# Patient Record
Sex: MALE | Race: BLACK OR AFRICAN AMERICAN | Employment: STUDENT | URBAN - METROPOLITAN AREA
[De-identification: names, ages, dates, MRNs, and addresses within clinical notes are randomized per-mention and may not be internally consistent; named-entity substitution may affect disease eponyms.]

---
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
## 2022-08-06 ENCOUNTER — APPOINTMENT (OUTPATIENT)
Dept: GENERAL RADIOLOGY | Age: 22
End: 2022-08-06
Payer: COMMERCIAL

## 2022-08-06 ENCOUNTER — HOSPITAL ENCOUNTER (EMERGENCY)
Age: 22
Discharge: HOME | End: 2022-08-06
Payer: COMMERCIAL

## 2022-08-06 VITALS
WEIGHT: 165 LBS | OXYGEN SATURATION: 98 % | RESPIRATION RATE: 18 BRPM | DIASTOLIC BLOOD PRESSURE: 84 MMHG | HEART RATE: 68 BPM | SYSTOLIC BLOOD PRESSURE: 160 MMHG | BODY MASS INDEX: 25.46 KG/M2 | TEMPERATURE: 98.7 F

## 2022-08-06 DIAGNOSIS — T14.8XXA AVULSION FRACTURE: ICD-10-CM

## 2022-08-06 DIAGNOSIS — S89.92XA INJURY OF LEFT KNEE, INITIAL ENCOUNTER: Primary | ICD-10-CM

## 2022-08-06 PROCEDURE — 6370000000 HC RX 637 (ALT 250 FOR IP): Performed by: PHYSICIAN ASSISTANT

## 2022-08-06 PROCEDURE — 73562 X-RAY EXAM OF KNEE 3: CPT

## 2022-08-06 PROCEDURE — 99283 EMERGENCY DEPT VISIT LOW MDM: CPT

## 2022-08-06 RX ORDER — IBUPROFEN 600 MG/1
600 TABLET ORAL ONCE
Status: COMPLETED | OUTPATIENT
Start: 2022-08-06 | End: 2022-08-06

## 2022-08-06 RX ORDER — ONDANSETRON 4 MG/1
4 TABLET, ORALLY DISINTEGRATING ORAL EVERY 8 HOURS PRN
Qty: 15 TABLET | Refills: 0 | Status: SHIPPED | OUTPATIENT
Start: 2022-08-06 | End: 2022-08-06

## 2022-08-06 RX ORDER — HYDROCODONE BITARTRATE AND ACETAMINOPHEN 5; 325 MG/1; MG/1
1 TABLET ORAL ONCE
Status: COMPLETED | OUTPATIENT
Start: 2022-08-06 | End: 2022-08-06

## 2022-08-06 RX ORDER — HYDROCODONE BITARTRATE AND ACETAMINOPHEN 5; 325 MG/1; MG/1
1 TABLET ORAL EVERY 4 HOURS PRN
Qty: 18 TABLET | Refills: 0 | Status: SHIPPED | OUTPATIENT
Start: 2022-08-06 | End: 2022-08-09

## 2022-08-06 RX ORDER — LIDOCAINE 4 G/G
1 PATCH TOPICAL ONCE
Status: DISCONTINUED | OUTPATIENT
Start: 2022-08-06 | End: 2022-08-06 | Stop reason: HOSPADM

## 2022-08-06 RX ORDER — ONDANSETRON 4 MG/1
4 TABLET, ORALLY DISINTEGRATING ORAL EVERY 8 HOURS PRN
Qty: 15 TABLET | Refills: 0 | Status: SHIPPED | OUTPATIENT
Start: 2022-08-06 | End: 2022-08-17

## 2022-08-06 RX ORDER — HYDROCODONE BITARTRATE AND ACETAMINOPHEN 5; 325 MG/1; MG/1
1 TABLET ORAL EVERY 4 HOURS PRN
Qty: 18 TABLET | Refills: 0 | Status: SHIPPED | OUTPATIENT
Start: 2022-08-06 | End: 2022-08-06

## 2022-08-06 RX ADMIN — HYDROCODONE BITARTRATE AND ACETAMINOPHEN 1 TABLET: 5; 325 TABLET ORAL at 20:39

## 2022-08-06 RX ADMIN — IBUPROFEN 600 MG: 600 TABLET ORAL at 20:39

## 2022-08-06 ASSESSMENT — ENCOUNTER SYMPTOMS
NAUSEA: 0
VOMITING: 0

## 2022-08-07 NOTE — ED PROVIDER NOTES
[facility]        Pt Name: Susan Campos  MRN: [id number]  [place] 2000  Date of evaluation: 8/6/2022  Provider: Rogelio Ho PA-C  PCP: Nica Guzman MD  Note Started: 10:47 PM EDT       MELO. I have evaluated this patient. My supervising physician was available for consultation. CHIEF COMPLAINT       Chief Complaint   Patient presents with    Knee Injury     Pt injured L knee playing basketball today, states knee \"gave out and buckled inward\"        HISTORY OF PRESENT ILLNESS   (Location, Timing/Onset, Context/Setting, Quality, Duration, Modifying Factors, Severity, Associated Signs and Symptoms)  Note limiting factors. Chief Complaint: Left knee injury    Susan Campos is a 24 y.o. male who presents to the emergency department today for evaluation for a left knee injury which occurred shortly before arriving to the ED. The patient states that he was playing basketball, when he states that he went up to dunk the ball, and he states that when he landed, he landed with his left leg completely straight. He states that this caused his knee to bend. The patient states that he noticed increasing pain to his left knee since. His pain is worse with touch, certain movements. Patient denies falling on the knee otherwise. The patient has no numbness, tingling or weakness. He is no fever or chills. Is no nausea or vomiting patient otherwise has no other complaints or injuries. Nursing Notes were all reviewed and agreed with or any disagreements were addressed in the HPI. REVIEW OF SYSTEMS    (2-9 systems for level 4, 10 or more for level 5)     Review of Systems   Constitutional:  Negative for activity change, appetite change and fever. Gastrointestinal:  Negative for nausea and vomiting. Musculoskeletal:  Positive for arthralgias. Neurological:  Negative for weakness and numbness.      Positives and Pertinent negatives as per HPI. Except as noted above in the ROS, all other systems were reviewed and negative. PAST MEDICAL HISTORY   No past medical history on file. SURGICAL HISTORY   No past surgical history on file. [address]       Discharge Medication List as of 8/6/2022  9:32 PM        CONTINUE these medications which have NOT CHANGED    Details   naproxen sodium (ANAPROX) 550 MG tablet Take 1 tablet by mouth 2 times daily (with meals), Disp-20 tablet, R-0Take w/ foodNormal               ALLERGIES     Patient has no known allergies. FAMILYHISTORY       Family History   Problem Relation Age of Onset    High Blood Pressure Father     Early Death Father 22    High Blood Pressure Maternal Grandmother     High Blood Pressure Paternal Grandfather           SOCIAL HISTORY       Social History     Tobacco Use    Smoking status: Never    Smokeless tobacco: Never   Substance Use Topics    Alcohol use: No    Drug use: No       SCREENINGS    Eugene Coma Scale  Eye Opening: Spontaneous  Best Verbal Response: Oriented  Best Motor Response: Obeys commands  Rissa Coma Scale Score: 15        PHYSICAL EXAM    (up to 7 for level 4, 8 or more for level 5)     ED Triage Vitals [08/06/22 2018]   BP Temp Temp Source Heart Rate Resp SpO2 Height Weight   (!) 160/84 98.7 °F (37.1 °C) Oral 68 18 98 % -- 165 lb (74.8 kg)       Physical Exam  Vitals and nursing note reviewed. Constitutional:       Appearance: He is well-developed. He is not diaphoretic. HENT:      Head: Normocephalic and atraumatic. Right Ear: External ear normal.      Left Ear: External ear normal.      Nose: Nose normal.   Eyes:      General:         Right eye: No discharge. Left eye: No discharge. Neck:      Trachea: No tracheal deviation. Cardiovascular:      Pulses: Normal pulses. Pulmonary:      Effort: Pulmonary effort is normal. No respiratory distress. Musculoskeletal:         General: Normal range of motion.       Cervical back: Normal range of motion and neck supple. Comments: There is tenderness, diffusely noted about the left knee, although increasing over the lateral aspect. Mild edema noted. No erythema or warmth. Patient has full passive range of motion of the knee, no joint laxity, although does experience pain. Achilles is intact. No tenderness noted of the hip or lower leg. Dorsalis pedis and posterior tibialis pulse are 2+. Normal sensation light touch per neurovascularly intact   Skin:     General: Skin is warm and dry. Neurological:      Mental Status: He is alert and oriented to person, place, and time. Psychiatric:         Behavior: Behavior normal.       DIAGNOSTIC RESULTS   LABS:    Labs Reviewed - No data to display    When ordered only abnormal lab results are displayed. All other labs were within normal range or not returned as of this dictation. EKG: When ordered, EKG's are interpreted by the Emergency Department Physician in the absence of a cardiologist.  Please see their note for interpretation of EKG. RADIOLOGY:   Non-plain film images such as CT, Ultrasound and MRI are read by the radiologist. Plain radiographic images are visualized and preliminarily interpreted by the ED Provider with the below findings:        Interpretation per the Radiologist below, if available at the time of this note:    XR KNEE LEFT (3 VIEWS)   Final Result   Avulsion fracture of the lateral tibial plateau consistent with Segond   fracture. This has a high correlation with ACL injury. Recommend further   evaluation with MRI to assess for ACL injury. XR KNEE LEFT (3 VIEWS)    Result Date: 8/6/2022  EXAMINATION: THREE XRAY VIEWS OF THE LEFT KNEE 8/6/2022 8:41 pm COMPARISON: None.  HISTORY: ORDERING SYSTEM PROVIDED HISTORY: pain TECHNOLOGIST PROVIDED HISTORY: Reason for exam:->pain Reason for Exam: Knee Injury (Pt injured L knee playing basketball today, states knee \"gave out and buckled inward\" ) FINDINGS: Small bony fragment at the lateral tibial plateau which is consistent with an avulsion fracture. There is no joint effusion. There is normal bone mineralization. There is normal alignment. Avulsion fracture of the lateral tibial plateau consistent with Segond fracture. This has a high correlation with ACL injury. Recommend further evaluation with MRI to assess for ACL injury. PROCEDURES   Unless otherwise noted below, none     Procedures    CRITICAL CARE TIME       CONSULTS:  None      EMERGENCY DEPARTMENT COURSE and DIFFERENTIAL DIAGNOSIS/MDM:   Vitals:    Vitals:    08/06/22 2018   BP: (!) 160/84   Pulse: 68   Resp: 18   Temp: 98.7 °F (37.1 °C)   TempSrc: Oral   SpO2: 98%   Weight: 165 lb (74.8 kg)       Patient was given the following medications:  Medications   lidocaine 4 % external patch 1 patch (1 patch TransDERmal Patch Applied 8/6/22 2041)   HYDROcodone-acetaminophen (NORCO) 5-325 MG per tablet 1 tablet (1 tablet Oral Given 8/6/22 2039)   ibuprofen (ADVIL;MOTRIN) tablet 600 mg (600 mg Oral Given 8/6/22 2039)         Is this patient to be included in the SEP-1 Core Measure due to severe sepsis or septic shock? No   Exclusion criteria - the patient is NOT to be included for SEP-1 Core Measure due to: Infection is not suspected    Briefly, this is a 41-year-old male who presents to the emergency department today for evaluation for a left knee injury which occurred while playing basketball today. On exam does have tenderness noted diffusely of the left knee although increasing over the lateral aspect. He is neurovascular intact. X-ray does show an avulsion fracture of the lateral tibial plateau consistent with a seated fracture correlation with likely ACL injury. Patient will be placed in a knee immobilizer, he will be given crutches as well as pain medication and nausea medication for home. He was referred to orthopedics for follow-up within the next 3 to 5 days.   Reportedly discussed at home otherwise. He is to return to the ED for any new or worsening symptoms, patient voiced understanding is agreeable with plan. Stable for discharge. No results found for this visit on 08/06/22. I estimate there is LOW risk for COMPARTMENT SYNDROME, DEEP VENOUS THROMBOSIS, SEPTIC ARTHRITIS, TENDON OR NEUROVASCULAR INJURY, thus I consider the discharge disposition reasonable. Kasey Daniel and I have discussed the diagnosis and risks, and we agree with discharging home to follow-up with their primary doctor or the referral orthopedist. We also discussed returning to the Emergency Department immediately if new or worsening symptoms occur. We have discussed the symptoms which are most concerning (e.g., changing or worsening pain, numbness, weakness) that necessitate immediate return. FINAL IMPRESSION      1. Injury of left knee, initial encounter    2. Avulsion fracture          DISPOSITION/PLAN   DISPOSITION Decision To Discharge 08/06/2022 09:35:30 PM      PATIENT REFERRED TO:  Jersey Vázquez MD  [address] [place] [address]. [place] [address]    Schedule an appointment as soon as possible for a visit in 3 days      [facility] Emergency Department  [address] [place] [address]  [phone]    As needed, If symptoms worsen      DISCHARGE MEDICATIONS:  Discharge Medication List as of 8/6/2022  9:32 PM        START taking these medications    Details   HYDROcodone-acetaminophen (NORCO) 5-325 MG per tablet Take 1 tablet by mouth every 4 hours as needed for Pain for up to 3 days. Intended supply: 3 days.  Take lowest dose possible to manage pain, Disp-18 tablet, R-0Normal      ondansetron (ZOFRAN ODT) 4 MG disintegrating tablet Take 1 tablet by mouth every 8 hours as needed for Nausea or Vomiting, Disp-15 tablet, R-0Normal             DISCONTINUED MEDICATIONS:  Discharge Medication List as of 8/6/2022  9:32 PM                 (Please note that portions of this note were completed with a voice recognition program.  Efforts were made to edit the dictations but occasionally words are mis-transcribed.)    Olesya Alejandra PA-C (electronically signed)            Olesya Alejandra PA-C  08/06/22 2990

## 2022-08-07 NOTE — ED NOTES
Discharge and education instructions reviewed. Patient verbalized understanding, teach-back successful. Patient denied questions at this time. No acute distress noted. Patient instructed to follow-up as noted - return to emergency department if symptoms worsen. Patient verbalized understanding. Discharged per EDMD with discharged instructions. Knee immobilizer applied prior to discharge. Patient given crutches and crutch teaching was performed. Patient verbalized understanding and demonstrated proper crutch use.         Rosy Dowell RN  08/06/22 1157

## 2022-08-08 ENCOUNTER — TELEPHONE (OUTPATIENT)
Dept: FAMILY MEDICINE CLINIC | Age: 22
End: 2022-08-08

## 2022-08-08 DIAGNOSIS — S89.92XD INJURY OF LEFT KNEE, SUBSEQUENT ENCOUNTER: Primary | ICD-10-CM

## 2022-08-08 NOTE — TELEPHONE ENCOUNTER
----- Message from Smita Noel sent at 8/8/2022 11:37 AM EDT -----  Subject: Referral Request    Reason for referral request? Orthopedicsurgeon   Provider patient wants to be referred to(if known):     Provider Phone Number(if known): Additional Information for Provider?  Pt needs a referral for an orthopedic   surgeon for his left knee for an injury that happened on saturday pt was   in ER saturday 08/06 and may need surgery the ortho he was referred cannot   see him for at least a week needs to be seen sooner contact andrzej maria (she   is on [facility]aa)  ---------------------------------------------------------------------------  --------------  [id number] [email or web address]    [phone]; OK to leave message on voicemail  ---------------------------------------------------------------------------  --------------

## 2022-08-08 NOTE — TELEPHONE ENCOUNTER
Susannah Javed,    I have sent a referral to a knee specialist with [address] Kennedi Phan, [address], [address] [place] [address]  Phone: [phone]    Let us know if there is anything else we can do for you.     Rc Guzman CNP

## 2022-08-17 ENCOUNTER — OFFICE VISIT (OUTPATIENT)
Dept: FAMILY MEDICINE CLINIC | Age: 22
End: 2022-08-17
Payer: COMMERCIAL

## 2022-08-17 VITALS
HEIGHT: 68 IN | OXYGEN SATURATION: 98 % | HEART RATE: 52 BPM | SYSTOLIC BLOOD PRESSURE: 126 MMHG | BODY MASS INDEX: 25.46 KG/M2 | RESPIRATION RATE: 18 BRPM | DIASTOLIC BLOOD PRESSURE: 78 MMHG | WEIGHT: 168 LBS

## 2022-08-17 DIAGNOSIS — S83.512S RUPTURE OF ANTERIOR CRUCIATE LIGAMENT OF LEFT KNEE, SEQUELA: ICD-10-CM

## 2022-08-17 DIAGNOSIS — S83.282S ACUTE LATERAL MENISCUS TEAR OF LEFT KNEE, SEQUELA: ICD-10-CM

## 2022-08-17 DIAGNOSIS — Z01.818 PREOP EXAMINATION: ICD-10-CM

## 2022-08-17 DIAGNOSIS — Z76.89 ENCOUNTER TO ESTABLISH CARE: Primary | ICD-10-CM

## 2022-08-17 PROCEDURE — 99204 OFFICE O/P NEW MOD 45 MIN: CPT | Performed by: NURSE PRACTITIONER

## 2022-08-17 SDOH — ECONOMIC STABILITY: FOOD INSECURITY: WITHIN THE PAST 12 MONTHS, YOU WORRIED THAT YOUR FOOD WOULD RUN OUT BEFORE YOU GOT MONEY TO BUY MORE.: NEVER TRUE

## 2022-08-17 SDOH — ECONOMIC STABILITY: FOOD INSECURITY: WITHIN THE PAST 12 MONTHS, THE FOOD YOU BOUGHT JUST DIDN'T LAST AND YOU DIDN'T HAVE MONEY TO GET MORE.: NEVER TRUE

## 2022-08-17 ASSESSMENT — PATIENT HEALTH QUESTIONNAIRE - PHQ9
SUM OF ALL RESPONSES TO PHQ QUESTIONS 1-9: 0
1. LITTLE INTEREST OR PLEASURE IN DOING THINGS: 0
SUM OF ALL RESPONSES TO PHQ9 QUESTIONS 1 & 2: 0
2. FEELING DOWN, DEPRESSED OR HOPELESS: 0
SUM OF ALL RESPONSES TO PHQ QUESTIONS 1-9: 0

## 2022-08-17 ASSESSMENT — SOCIAL DETERMINANTS OF HEALTH (SDOH): HOW HARD IS IT FOR YOU TO PAY FOR THE VERY BASICS LIKE FOOD, HOUSING, MEDICAL CARE, AND HEATING?: NOT HARD AT ALL

## 2022-08-17 NOTE — PROGRESS NOTES
Preoperative Consultation      Baylee Murillo  YOB: 2000    Date of Service:  8/17/2022    Vitals:    08/17/22 0825   BP: 126/78   Site: Left Upper Arm   Position: Sitting   Cuff Size: Medium Adult   Pulse: 52   Resp: 18   SpO2: 98%   Weight: 168 lb (76.2 kg)   Height: 5' 7.5\" (1.715 m)      Wt Readings from Last 2 Encounters:   08/17/22 168 lb (76.2 kg)   08/06/22 165 lb (74.8 kg)     BP Readings from Last 3 Encounters:   08/17/22 126/78   08/06/22 (!) 160/84   01/09/19 126/82        Chief Complaint   Patient presents with    New Patient     RE EST CARE     Pre-op Exam     PRE OP EXAM, KNEE, 08/25/2022 BEACON      No Known Allergies  No outpatient medications have been marked as taking for the 8/17/22 encounter (Office Visit) with ОЛЬГА Soto CNP. This patient presents to the office today for a preoperative consultation at the request of surgeon, Dr. Lorrie Palma, who plans on performing Left knee arthroscopic ACL reconstruction, lateral meniscus repair on August 25 at [facility]. The current problem began 11 day ago, and symptoms have been unchanged with time. Dunked while playing basketball and felt a pop when landing. Conservative therapy: No.    Marcel Mcgee has not been seen in 3 years. He states that there were no changes to his medical history in this time aside from the torn ACL and meniscus. He does now vape and use marijuana. Does not use every day. He also rarely drinks alcohol. No changes to his family history. Planned anesthesia: General   Known anesthesia problems: None   Bleeding risk: No recent or remote history of abnormal bleeding  Personal or FH of DVT/PE: No    Patient objection to receiving blood products: No    There is no problem list on file for this patient. History reviewed. No pertinent past medical history. History reviewed. No pertinent surgical history.   Family History   Problem Relation Age of Onset    High Blood Pressure Father     Early Death Father 22 Heart Attack Paternal Uncle     High Blood Pressure Maternal Grandmother     High Blood Pressure Paternal Grandfather      Social History     Socioeconomic History    Marital status: Single     Spouse name: Not on file    Number of children: Not on file    Years of education: Not on file    Highest education level: Not on file   Occupational History    Occupation: Student    Tobacco Use    Smoking status: Never    Smokeless tobacco: Never   Vaping Use    Vaping Use: Some days    Substances: Nicotine, CBD   Substance and Sexual Activity    Alcohol use: Yes     Comment: Rare    Drug use: Yes     Types: Marijuana (Weed)     Comment: 5 days a week    Sexual activity: Yes     Partners: Female   Other Topics Concern    Not on file   Social History Narrative    Not on file     Social Determinants of Health     Financial Resource Strain: Low Risk     Difficulty of Paying Living Expenses: Not hard at all   Food Insecurity: No Food Insecurity    Worried About Running Out of Food in the Last Year: Never true    Ran Out of Food in the Last Year: Never true   Transportation Needs: Not on file   Physical Activity: Not on file   Stress: Not on file   Social Connections: Not on file   Intimate Partner Violence: Not on file   Housing Stability: Not on file       Review of Systems  A comprehensive review of systems was negative except for what was noted in the HPI. Physical Exam   Constitutional: He is oriented to person, place, and time. He appears well-developed and well-nourished. No distress. HENT:   Head: Normocephalic and atraumatic. Mouth/Throat: Uvula is midline, oropharynx is clear and moist and mucous membranes are normal.   Eyes: Conjunctivae and EOM are normal. Pupils are equal, round, and reactive to light. Neck: Trachea normal and normal range of motion. Neck supple. No JVD present. Carotid bruit is not present. No mass and no thyromegaly present.    Cardiovascular: Normal rate, regular rhythm, normal heart sounds and intact distal pulses. Exam reveals no gallop and no friction rub. No murmur heard. Pulmonary/Chest: Effort normal and breath sounds normal. No respiratory distress. He has no wheezes. He has no rales. Abdominal: Soft. Normal aorta and bowel sounds are normal. He exhibits no distension and no mass. There is no hepatosplenomegaly. No tenderness. Musculoskeletal: He exhibits no edema and no tenderness. Neurological: He is alert and oriented to person, place, and time. He has normal strength. No cranial nerve deficit or sensory deficit. Coordination and gait normal.   Skin: Skin is warm and dry. No rash noted. No erythema. Psychiatric: He has a normal mood and affect. His behavior is normal.     EKG Interpretation:  Not indicated. Lab Review not applicable        Assessment:       24 y.o. patient with planned surgery as above. Known risk factors for perioperative complications: None  Current medications which may produce withdrawal symptoms if withheld perioperatively: none      Plan:   1. Encounter to establish care  -The patient's medical, surgical, social, and family history are reviewed with the patient. He is not on any medication    2. Rupture of anterior cruciate ligament of left knee, sequela  -Cleared for surgery    3. Acute lateral meniscus tear of left knee, sequela  -Cleared for surgery    4. Preop examination   1. Preoperative workup as follows: none  2. Change in medication regimen before surgery: Discontinue NSAIDs (ibuprofen, aleve) 7 days before surgery  3.  Prophylaxis for cardiac events with perioperative beta-blockers: Not indicated  ACC/AHA indications for pre-operative beta-blocker use:    Vascular surgery with history of postitive stress test  Intermediate or high risk surgery with history of CAD   Intermediate or high risk surgery with multiple clinical predictors of CAD- 2 of the following: history of compensated or prior heart failure, history of cerebrovascular disease, DM, or renal insufficiency    Routine administration of higher-dose, long-acting metoprolol in beta-blocker-naïve patients on the day of surgery, and in the absence of dose titration is associated with an overall increase in mortality. Beta-blockers should be started days to weeks prior to surgery and titrated to pulse < 70.  4. Deep vein thrombosis prophylaxis: regimen to be chosen by surgical team  5. No contraindications to planned surgery    This dictation was generated by voice recognition computer software. Although all attempts are made to edit the dictation for accuracy, there may be errors in the transcription that are not intended. An electronic signature was used to authenticate this note.     --Andre Hernandez, ОЛЬГА - CNP

## 2023-10-16 ENCOUNTER — TELEPHONE (OUTPATIENT)
Dept: FAMILY MEDICINE CLINIC | Age: 23
End: 2023-10-16

## 2023-10-16 NOTE — TELEPHONE ENCOUNTER
SPOKE TO PT AND SCHEDULED HIM FOR WED 10/18/2023 WITH Burt Manzano CNP. HE WILL CALL IF SYMPTOMS WORSEN.  [address]

## 2023-10-16 NOTE — TELEPHONE ENCOUNTER
Pt is wanting to get in to see Jarrett Alaniz. He has knot (small painful right Quad). He also dropped a weight on his toe-he was caring for it at home and needs someone to look at it. Please call pt --Can we fit him in?              ----- Message from Georges Wolfe sent at 10/16/2023 12:44 PM EDT -----  Subject: Appointment Request    Reason for Call: Established Patient Appointment needed: Routine Physical   Exam    QUESTIONS    Reason for appointment request? No appointments available during search     Additional Information for Provider? No appointments available during   search. Pt needs to schedule a physical. Hasn't been seen by the provider   in a while. Pls call the pt back to get this appt scheduled.    ---------------------------------------------------------------------------  --------------  Aniceto Eugene GSQV  [phone]; OK to leave message on voicemail  ---------------------------------------------------------------------------  --------------  SCRIPT ANSWERS

## 2023-10-18 ENCOUNTER — OFFICE VISIT (OUTPATIENT)
Dept: FAMILY MEDICINE CLINIC | Age: 23
End: 2023-10-18
Payer: COMMERCIAL

## 2023-10-18 VITALS
HEIGHT: 68 IN | BODY MASS INDEX: 26.98 KG/M2 | DIASTOLIC BLOOD PRESSURE: 68 MMHG | SYSTOLIC BLOOD PRESSURE: 128 MMHG | WEIGHT: 178 LBS | HEART RATE: 68 BPM

## 2023-10-18 DIAGNOSIS — D17.23 LIPOMA OF RIGHT LOWER EXTREMITY: Primary | ICD-10-CM

## 2023-10-18 PROCEDURE — 99213 OFFICE O/P EST LOW 20 MIN: CPT | Performed by: NURSE PRACTITIONER

## 2023-10-18 SDOH — ECONOMIC STABILITY: FOOD INSECURITY: WITHIN THE PAST 12 MONTHS, THE FOOD YOU BOUGHT JUST DIDN'T LAST AND YOU DIDN'T HAVE MONEY TO GET MORE.: NEVER TRUE

## 2023-10-18 SDOH — ECONOMIC STABILITY: HOUSING INSECURITY
IN THE LAST 12 MONTHS, WAS THERE A TIME WHEN YOU DID NOT HAVE A STEADY PLACE TO SLEEP OR SLEPT IN A SHELTER (INCLUDING NOW)?: NO

## 2023-10-18 SDOH — ECONOMIC STABILITY: FOOD INSECURITY: WITHIN THE PAST 12 MONTHS, YOU WORRIED THAT YOUR FOOD WOULD RUN OUT BEFORE YOU GOT MONEY TO BUY MORE.: NEVER TRUE

## 2023-10-18 SDOH — ECONOMIC STABILITY: INCOME INSECURITY: HOW HARD IS IT FOR YOU TO PAY FOR THE VERY BASICS LIKE FOOD, HOUSING, MEDICAL CARE, AND HEATING?: NOT HARD AT ALL

## 2023-10-18 ASSESSMENT — PATIENT HEALTH QUESTIONNAIRE - PHQ9
2. FEELING DOWN, DEPRESSED OR HOPELESS: 0
SUM OF ALL RESPONSES TO PHQ QUESTIONS 1-9: 0
SUM OF ALL RESPONSES TO PHQ9 QUESTIONS 1 & 2: 0
SUM OF ALL RESPONSES TO PHQ QUESTIONS 1-9: 0
1. LITTLE INTEREST OR PLEASURE IN DOING THINGS: 0
SUM OF ALL RESPONSES TO PHQ QUESTIONS 1-9: 0
SUM OF ALL RESPONSES TO PHQ QUESTIONS 1-9: 0

## 2023-10-18 NOTE — PROGRESS NOTES
Brad Hernandez (:  2000) is a 25 y.o. male,Established patient, here for evaluation of the following chief complaint(s):    Leg Pain (RIGHT QUAD PAIN/ KNOT/RIGHT BIG TO INJURY DURING WEIGHTS)      SUBJECTIVE/OBJECTIVE:  HPI  AUGUST C/O RIGHT UPPER THIGH  HAD SOME PAIN A FEW DAYS AGO   HAS A KNOT ON THE OUTSIDE OF HIS UPPER THIGH WAS SMALLER THAN A DIME - NOW PEA SIZED - TRIED HEAT AND ICE RIGHT GREAT TOE PAIN FIVE  MONTHS AGO HE DROPPED A 25 POUND WEIGHT - A FEW WEEKS LATER HE DROPPED A CAN ON IT - THE NAIL BED IS DARK  BUT NOT PANFUL  Review of Systems   Musculoskeletal:  Positive for arthralgias. Physical Exam  Vitals reviewed. Constitutional:       General: He is awake. He is not in acute distress. Appearance: Normal appearance. He is well-developed and well-groomed. He is not ill-appearing, toxic-appearing or diaphoretic. Cardiovascular:      Rate and Rhythm: Normal rate and regular rhythm. Heart sounds: Normal heart sounds, S1 normal and S2 normal.   Pulmonary:      Effort: Pulmonary effort is normal.      Breath sounds: Normal breath sounds and air entry. Musculoskeletal:      Comments: Pea-sized firm rubbery mobile mass noted left upper thigh outer quadrant   Neurological:      Mental Status: He is alert. Psychiatric:         Attention and Perception: Attention and perception normal.         Mood and Affect: Mood normal.         Speech: Speech normal.         Behavior: Behavior normal. Behavior is cooperative. Thought Content: Thought content normal.         Cognition and Memory: Cognition and memory normal.         Judgment: Judgment normal.         August was seen today for leg pain.     Diagnoses and all orders for this visit:    Lipoma of right lower extremity  Benign nature of lipoma discussed with patient   advised to monitor for increasing size advised that it is usually slow-growing    In regards to his toenail advised patient that it was likely follow-up for this use

## 2025-05-22 ENCOUNTER — TELEPHONE (OUTPATIENT)
Dept: FAMILY MEDICINE CLINIC | Age: 25
End: 2025-05-22

## 2025-05-22 NOTE — TELEPHONE ENCOUNTER
CHECKED WITH SOCO, SCHEDULED PATIENT WITH DR. ORDAZ ON 5/30/25 AT 11:30 AM. SPOKE TO PATIENT AND HE SAID THAT WAS GOOD FOR HIM. HE WILL BRING PAPERWORK. [place]

## 2025-05-22 NOTE — TELEPHONE ENCOUNTER
----- Message from Keny CORREA sent at 5/22/2025 11:31 AM EDT -----  Regarding: [facility] Appointment Request  [facility] Appointment Request    Patient needs appointment for [facility] Appointment Type: Pre-Op Visit.    Patient Requested Dates(s):as soon as possible    Patient Requested Time:morning     Provider Name:Wili Jimenez OMER, APRN - CNP     Reason for Appointment Request: Established Patient - Available appointments did not meet patient need and the patient needs to see his doctor before his knee surgery on 6/06/2025  --------------------------------------------------------------------------------------------------------------------------    Relationship to Patient: Self     Call Back Information: OK to leave message on voicemail  Preferred Call Back Number:[phone]

## 2025-05-30 ENCOUNTER — OFFICE VISIT (OUTPATIENT)
Dept: FAMILY MEDICINE CLINIC | Age: 25
End: 2025-05-30

## 2025-05-30 VITALS
WEIGHT: 199.2 LBS | OXYGEN SATURATION: 97 % | HEART RATE: 66 BPM | SYSTOLIC BLOOD PRESSURE: 122 MMHG | DIASTOLIC BLOOD PRESSURE: 72 MMHG | BODY MASS INDEX: 28.52 KG/M2 | HEIGHT: 70 IN | TEMPERATURE: 98.3 F

## 2025-05-30 DIAGNOSIS — Z01.818 PREOP EXAMINATION: Primary | ICD-10-CM

## 2025-05-30 PROCEDURE — 99213 OFFICE O/P EST LOW 20 MIN: CPT

## 2025-05-30 RX ORDER — HYDROCODONE BITARTRATE AND ACETAMINOPHEN 5; 325 MG/1; MG/1
1 TABLET ORAL EVERY 4 HOURS PRN
COMMUNITY
Start: 2025-05-02

## 2025-05-30 SDOH — ECONOMIC STABILITY: FOOD INSECURITY: WITHIN THE PAST 12 MONTHS, THE FOOD YOU BOUGHT JUST DIDN'T LAST AND YOU DIDN'T HAVE MONEY TO GET MORE.: NEVER TRUE

## 2025-05-30 SDOH — ECONOMIC STABILITY: FOOD INSECURITY: WITHIN THE PAST 12 MONTHS, YOU WORRIED THAT YOUR FOOD WOULD RUN OUT BEFORE YOU GOT MONEY TO BUY MORE.: NEVER TRUE

## 2025-05-30 ASSESSMENT — PATIENT HEALTH QUESTIONNAIRE - PHQ9
2. FEELING DOWN, DEPRESSED OR HOPELESS: NOT AT ALL
SUM OF ALL RESPONSES TO PHQ QUESTIONS 1-9: 0
1. LITTLE INTEREST OR PLEASURE IN DOING THINGS: NOT AT ALL
SUM OF ALL RESPONSES TO PHQ QUESTIONS 1-9: 0

## 2025-05-30 NOTE — PROGRESS NOTES
09/16/2015 NEG     Sodium 09/16/2015 141     Potassium 09/16/2015 4.3     Chloride 09/16/2015 103     CO2 09/16/2015 24     Anion Gap 09/16/2015 14     Glucose 09/16/2015 77     BUN 09/16/2015 11     Creatinine 09/16/2015 0.6     GFR Non- 09/16/2015 >60     GFR  09/16/2015 >60     Calcium 09/16/2015 9.0     Total Protein 09/16/2015 7.8     Albumin 09/16/2015 4.4     Albumin/Globulin Ratio 09/16/2015 1.3     Total Bilirubin 09/16/2015 0.4     Alkaline Phosphatase 09/16/2015 444 (H)     ALT 09/16/2015 25     AST 09/16/2015 28     Globulin 09/16/2015 3.4     Aldosterone 09/16/2015 14.8    .    Patient's medications, allergies, past medical, surgical, social and family histories were reviewed and updated as appropriate.    Review of Systems   All other systems reviewed and are negative.       Objective   Vitals:    05/30/25 1135   BP: 122/72   BP Site: Right Upper Arm   Patient Position: Sitting   BP Cuff Size: Large Adult   Pulse: 66   Temp: 98.3 °F (36.8 °C)   SpO2: 97%   Weight: 90.4 kg (199 lb 3.2 oz)   Height: 1.778 m (5' 10\")       Physical Exam  Vitals reviewed.   Constitutional:       General: He is not in acute distress.     Appearance: He is not ill-appearing.   HENT:      Head: Normocephalic and atraumatic.      Mouth/Throat:      Mouth: Mucous membranes are moist.      Pharynx: Oropharynx is clear.      Comments: Mallampati Class I  Cardiovascular:      Rate and Rhythm: Normal rate and regular rhythm.      Pulses: Normal pulses.      Heart sounds: Normal heart sounds.   Pulmonary:      Effort: Pulmonary effort is normal.      Breath sounds: Normal breath sounds.   Abdominal:      General: Bowel sounds are normal.      Palpations: Abdomen is soft.      Tenderness: There is no abdominal tenderness. There is no guarding.   Musculoskeletal:         General: Swelling and signs of injury present.   Skin:     General: Skin is warm and dry.   Neurological:      Mental Status: He is